# Patient Record
Sex: MALE | Race: BLACK OR AFRICAN AMERICAN | NOT HISPANIC OR LATINO | URBAN - METROPOLITAN AREA
[De-identification: names, ages, dates, MRNs, and addresses within clinical notes are randomized per-mention and may not be internally consistent; named-entity substitution may affect disease eponyms.]

---

## 2017-08-14 ENCOUNTER — EMERGENCY (EMERGENCY)
Facility: HOSPITAL | Age: 32
LOS: 1 days | Discharge: HOME | End: 2017-08-14
Attending: EMERGENCY MEDICINE | Admitting: EMERGENCY MEDICINE
Payer: COMMERCIAL

## 2017-08-14 VITALS
TEMPERATURE: 98 F | HEART RATE: 67 BPM | DIASTOLIC BLOOD PRESSURE: 78 MMHG | SYSTOLIC BLOOD PRESSURE: 146 MMHG | OXYGEN SATURATION: 99 % | RESPIRATION RATE: 18 BRPM

## 2017-08-14 DIAGNOSIS — S93.401A SPRAIN OF UNSPECIFIED LIGAMENT OF RIGHT ANKLE, INITIAL ENCOUNTER: ICD-10-CM

## 2017-08-14 DIAGNOSIS — M25.571 PAIN IN RIGHT ANKLE AND JOINTS OF RIGHT FOOT: ICD-10-CM

## 2017-08-14 PROCEDURE — 99283 EMERGENCY DEPT VISIT LOW MDM: CPT | Mod: 25

## 2017-08-14 PROCEDURE — 73610 X-RAY EXAM OF ANKLE: CPT | Mod: 26,RT

## 2017-08-15 VITALS
HEART RATE: 74 BPM | RESPIRATION RATE: 18 BRPM | SYSTOLIC BLOOD PRESSURE: 139 MMHG | DIASTOLIC BLOOD PRESSURE: 74 MMHG | TEMPERATURE: 98 F | OXYGEN SATURATION: 98 %

## 2017-08-15 PROCEDURE — 73610 X-RAY EXAM OF ANKLE: CPT | Mod: 26,RT

## 2017-08-15 RX ORDER — IBUPROFEN 200 MG
600 TABLET ORAL ONCE
Qty: 0 | Refills: 0 | Status: COMPLETED | OUTPATIENT
Start: 2017-08-15 | End: 2017-08-15

## 2017-08-15 RX ADMIN — Medication 600 MILLIGRAM(S): at 01:02

## 2017-08-15 NOTE — ED ADULT NURSE NOTE - OBJECTIVE STATEMENT
32y male, BIBEMS, with complaint of right ankle pain. Pt said he was walking, when he tripped, injuring his right ankle. No other trauma/injuries. No obvious deformity. denies head trauma. No wounds/lacerations noted. Pt complains of difficulty bearing weight on right foot.

## 2017-08-15 NOTE — ED PROVIDER NOTE - MEDICAL DECISION MAKING DETAILS
ankle sprain, no fracture on XR, will give nsaids, crutches as needed WBAT, ankle stirrup, follow up with ortho

## 2017-08-15 NOTE — ED PROVIDER NOTE - DIAGNOSTIC INTERPRETATION
xray R ankle: +soft tissue swelling. no acute fx or dislocation, joint space intact, no effusion noted

## 2017-08-15 NOTE — ED PROVIDER NOTE - OBJECTIVE STATEMENT
pt with R ankle pain after trip and inversion injury at 8pm, now increasing pain toR alteral ankle throbbing 6/10, worse with weight bearing, no radiation, no other injuries

## 2017-08-15 NOTE — ED PROVIDER NOTE - MUSCULOSKELETAL, MLM
Spine appears normal, range of motion is not limited, no muscle or joint tenderness, +R lateral mal tenderness with mild swelling, no foot ttp, SILT, 2+ pulses

## 2022-05-27 NOTE — ED ADULT NURSE NOTE - SEVERITY
Alert-The patient is alert, awake and responds to voice. The patient is oriented to time, place, and person. The triage nurse is able to obtain subjective information. PAIN SCALE 7 OF 10.

## 2022-09-01 ENCOUNTER — EMERGENCY (EMERGENCY)
Facility: HOSPITAL | Age: 37
LOS: 1 days | Discharge: HOME | End: 2022-09-01
Attending: STUDENT IN AN ORGANIZED HEALTH CARE EDUCATION/TRAINING PROGRAM | Admitting: STUDENT IN AN ORGANIZED HEALTH CARE EDUCATION/TRAINING PROGRAM

## 2022-09-01 VITALS
OXYGEN SATURATION: 99 % | DIASTOLIC BLOOD PRESSURE: 65 MMHG | HEART RATE: 75 BPM | SYSTOLIC BLOOD PRESSURE: 129 MMHG | TEMPERATURE: 97 F | RESPIRATION RATE: 16 BRPM

## 2022-09-01 PROCEDURE — 99284 EMERGENCY DEPT VISIT MOD MDM: CPT

## 2022-09-01 NOTE — ED ADULT TRIAGE NOTE - CHIEF COMPLAINT QUOTE
Pt c/o lower back pain beginning yesterday after exercising. was bending down when pain began. denies pmhx.

## 2022-09-01 NOTE — ED PROVIDER NOTE - OBJECTIVE STATEMENT
37 year old healthy males comes in for left sided lower back pain. He states he was deadlifting yesterday when he started to have some pain. After finishing a second set, he stopped and switched to the rest of his work out. He went home and this morning he noticed some left lower back pain/stiffness. He was able to go for a run this morning but he has found when he is standing up from a seated position, he has to move very slowly. He has not had any sensory changes in the legs and he has not had any bowel or bladder control changes. He denies any hx of back surgery and no saddle anesthesia.

## 2022-09-01 NOTE — ED PROVIDER NOTE - NSFOLLOWUPINSTRUCTIONS_ED_ALL_ED_FT
Take Tylenol and/or Ibuprofen for your back pain. Please rest and avoid doing exercises that make your back pain worse such as dead lifting.     If not improved in 2 weeks, follow with either:     [facility] (comprehensive, with Neurology, Orthopedic Surgery, Neurosurgery, and Physical Therapy): [phone].     Or Spine Surgeon Dr. Rodriges:    [phone]  [email or web address]  Email: [email or web address]    1. [place] Address: [address], [facility] [address] [facility], [address]. Hours: Mon-Fri 9am - 5pm and every 2nd Sat 9am - 3pm.    2. [place] Address: [address] [facility] [address] [place] [address] ([address] [facility]). Hours: Every Thursday 10am - 5pm.    3. [place] Address: [address] [facility] [facility] [address] (Inside [facility]). Hours: 10am - 5pm Every Tuesday (except 2nd) & Every 2nd Wednesday.

## 2022-09-01 NOTE — ED PROVIDER NOTE - ATTENDING CONTRIBUTION TO CARE
I have personally performed a face to face medical and diagnostic evaluation of the patient. I have discussed with and reviewed the Resident's note and agree with the History, ROS, Physical Exam and MDM unless otherwise indicated. A brief summary of my personal evaluation and impression can be found below.    37 year old healthy males comes in for left sided lower back pain. He states he was dead lifting yesterday when he started to have some pain. This am - left lower back pain/stiffness. He was able to go for a run this morning, but now having pain again. No direct injury to the back. No falls. Ambulating without difficulty. Denies paresthesias, weakness, urinary or bowel incontinence. Has not tried any meds for the pain yet.     On exam, VSS w +L straight leg raise. No midline tenderness in the C/T/L spine. No CVA tenderness. Patient neurovascularly intact. Possible sciatica/disc herniation. Will trial meds. Pt would benefit from spine center follow up. Return precautions given. Follow up information given. Shawna Glass, ED Attending

## 2022-09-01 NOTE — ED PROVIDER NOTE - PATIENT PORTAL LINK FT
You can access the FollowMyHealth Patient Portal offered by [facility] by registering at the following website: [email or web address]. By joining [employer]’s FollowMyHealth portal, you will also be able to view your health information using other applications (apps) compatible with our system.

## 2022-09-01 NOTE — ED PROVIDER NOTE - PHYSICAL EXAMINATION
GENERAL: Not in acute distress, non-toxic appearing  HEAD: normocephalic, atraumatic  HEENT: EOMI, normal conjunctiva, oral mucosa moist, neck supple  CARDIAC: regular rate and rhythm, normal S1 and S2,  no appreciable murmurs  PULM: clear to ascultation bilaterally, no crackles, rales, rhonchi, or wheezing  GI: abdomen nondistended, soft, nontender, no guarding or rebound tenderness  : No CVA tenderness  NEURO: alert and oriented x 3, normal speech, no focal motor or sensory deficits, gait normal, no gross neurologic deficit  MSK: + straight leg raise on the left side. Good strength in all 4 extremities. No visible deformities, no peripheral edema,   SKIN: No visible rashes, dry, well-perfused  PSYCH: appropriate mood and affect

## 2022-12-14 ENCOUNTER — EMERGENCY (EMERGENCY)
Facility: HOSPITAL | Age: 37
LOS: 1 days | Discharge: HOME | End: 2022-12-14
Admitting: EMERGENCY MEDICINE

## 2022-12-14 VITALS
RESPIRATION RATE: 18 BRPM | SYSTOLIC BLOOD PRESSURE: 165 MMHG | HEART RATE: 97 BPM | DIASTOLIC BLOOD PRESSURE: 99 MMHG | OXYGEN SATURATION: 100 % | TEMPERATURE: 101 F

## 2022-12-14 PROCEDURE — 99284 EMERGENCY DEPT VISIT MOD MDM: CPT

## 2022-12-14 NOTE — ED ADULT TRIAGE NOTE - CHIEF COMPLAINT QUOTE
Pt c/o chills x2 days. Denies cough/body aches. Pt states he took Tylenol around 6pm today with relief. Denies PMH.

## 2022-12-15 VITALS
DIASTOLIC BLOOD PRESSURE: 73 MMHG | HEART RATE: 93 BPM | OXYGEN SATURATION: 100 % | SYSTOLIC BLOOD PRESSURE: 142 MMHG | TEMPERATURE: 102 F | RESPIRATION RATE: 17 BRPM

## 2022-12-15 LAB
FLUAV AG NPH QL: SIGNIFICANT CHANGE UP
FLUBV AG NPH QL: SIGNIFICANT CHANGE UP
RSV RNA NPH QL NAA+NON-PROBE: SIGNIFICANT CHANGE UP
SARS-COV-2 RNA SPEC QL NAA+PROBE: DETECTED

## 2022-12-15 RX ORDER — ACETAMINOPHEN 500 MG
975 TABLET ORAL ONCE
Refills: 0 | Status: COMPLETED | OUTPATIENT
Start: 2022-12-15 | End: 2022-12-15

## 2022-12-15 RX ORDER — IBUPROFEN 200 MG
600 TABLET ORAL ONCE
Refills: 0 | Status: COMPLETED | OUTPATIENT
Start: 2022-12-15 | End: 2022-12-15

## 2022-12-15 RX ADMIN — Medication 975 MILLIGRAM(S): at 02:51

## 2022-12-15 RX ADMIN — Medication 600 MILLIGRAM(S): at 01:25

## 2022-12-15 NOTE — ED PROVIDER NOTE - CLINICAL SUMMARY MEDICAL DECISION MAKING FREE TEXT BOX
Called and left message for dad, with date and time of scheduled appointment and to call back if that does not work.   37yoM no PMH p/w chills/myalgias since last night  febrile on arrival  recent  baby, does not want to get sick    PLAN: VSS, nontoxic appearing  flu covid swab, motrin, dc

## 2022-12-15 NOTE — ED PROVIDER NOTE - PHYSICAL EXAMINATION
CONSTITUTIONAL: Well-appearing; well-nourished; in no apparent distress;  HEAD: Normocephalic, atraumatic;  EYES: conjunctiva and sclera WNL;  ENT: normal nose; no rhinorrhea; unremarkable pharynx  NECK/LYMPH: Supple  CARD: Normal S1, S2; no murmurs, rubs, or gallops noted  RESP: Normal chest excursion with respiration; breath sounds clear and equal bilaterally; no wheezes, rhonchi, or rales noted  EXT/MS: moves all extremities; distal pulses are normal, no pedal edema  SKIN: Normal for age and race; warm; dry; good turgor; no apparent lesions or exudate noted  NEURO: Awake, alert, oriented x 3, no gross deficits  PSYCH: Normal mood; appropriate affect

## 2022-12-15 NOTE — ED PROVIDER NOTE - NSFOLLOWUPINSTRUCTIONS_ED_ALL_ED_FT
may take tylenol up to 1000mg with food every 6-8 hours  may take ibuprofen up to 600mg with food every 6-8 hours for body aches.  it is likely you have the flu, covid or a viral illness. wear mask around others, wash your hands often, and drink plenty of fluids  return for shortness of breath, chest pain, dizziness, numbness, tingling, weakness, or any worsening symptoms.

## 2022-12-15 NOTE — ED PROVIDER NOTE - OBJECTIVE STATEMENT
37-year-old male no PMH presenting to ED complaining of chills/myalgias to his bilateral upper extremities.  Patient states this started last night.  Patient states that he was in the hospital with his partner who just had a baby last night when he felt very fatigued this morning and woke up with chills, took Tylenol around 6 PM which gave him some mild relief however the chills had returned prompting ER evaluation.  Patient concerned that he may be sick and does not want to get his child sick.  Patient denies any cough, sore throat, headaches, N/V/D/C/abdominal pain, dysuria.

## 2022-12-15 NOTE — ED PROVIDER NOTE - PATIENT PORTAL LINK FT
You can access the FollowMyHealth Patient Portal offered by [facility] by registering at the following website: [email or web address]. By joining [employer]’s FollowMyHealth portal, you will also be able to view your health information using other applications (apps) compatible with our system.

## 2023-04-09 NOTE — ED PROVIDER NOTE - NS ED ROS FT
None GENERAL: No fever, chills  HEENT: No cough, congestion, odynophagia, dysphagia  CARDIAC: No chest pain, palpitations, lightheadedness, syncope  PULM: No dyspnea, cough, wheezing   GI: No abdominal pain, nausea, vomiting, diarrhea, constipation, melena, hematochezia  : No urinary dysuria, frequency, incontinence, hematuria  NEURO: No headache, motor weakness, sensory changes  MSK: +back pain, No pain in extremities  SKIN: no rashes, hives, urticaria  HEME: no active bleeding, bruising

## 2025-02-14 ENCOUNTER — EMERGENCY (EMERGENCY)
Facility: HOSPITAL | Age: 40
LOS: 1 days | Discharge: HOME | End: 2025-02-14
Attending: EMERGENCY MEDICINE | Admitting: EMERGENCY MEDICINE
Payer: MEDICAID

## 2025-02-14 VITALS
HEIGHT: 72 IN | OXYGEN SATURATION: 100 % | SYSTOLIC BLOOD PRESSURE: 162 MMHG | DIASTOLIC BLOOD PRESSURE: 88 MMHG | WEIGHT: 179.9 LBS | RESPIRATION RATE: 16 BRPM | TEMPERATURE: 98 F | HEART RATE: 83 BPM

## 2025-02-14 PROBLEM — Z78.9 OTHER SPECIFIED HEALTH STATUS: Chronic | Status: ACTIVE | Noted: 2022-12-15

## 2025-02-14 PROCEDURE — 99284 EMERGENCY DEPT VISIT MOD MDM: CPT

## 2025-02-14 RX ORDER — LIDOCAINE HYDROCHLORIDE 30 MG/G
1 CREAM TOPICAL ONCE
Refills: 0 | Status: COMPLETED | OUTPATIENT
Start: 2025-02-14 | End: 2025-02-14

## 2025-02-14 RX ORDER — IBUPROFEN 600 MG/1
400 TABLET, FILM COATED ORAL ONCE
Refills: 0 | Status: COMPLETED | OUTPATIENT
Start: 2025-02-14 | End: 2025-02-14

## 2025-02-14 RX ORDER — IBUPROFEN 600 MG/1
1 TABLET, FILM COATED ORAL
Qty: 20 | Refills: 0
Start: 2025-02-14

## 2025-02-14 RX ORDER — ACETAMINOPHEN 160 MG/5ML
2 SUSPENSION ORAL
Qty: 30 | Refills: 0
Start: 2025-02-14

## 2025-02-14 RX ADMIN — Medication 5 MILLIGRAM(S): at 08:37

## 2025-02-14 RX ADMIN — IBUPROFEN 400 MILLIGRAM(S): 600 TABLET, FILM COATED ORAL at 08:37

## 2025-02-14 RX ADMIN — LIDOCAINE HYDROCHLORIDE 1 PATCH: 30 CREAM TOPICAL at 08:34

## 2025-02-14 NOTE — ED ADULT TRIAGE NOTE - CHIEF COMPLAINT QUOTE
Pt c/o left sided hip pain radiating to the knee. Pt unsure if he injured himself at the gym. Denies numbness, incontinence, and medical conditions, Able to ambulate and well appearing.

## 2025-02-14 NOTE — ED ADULT NURSE NOTE - OBJECTIVE STATEMENT
Pt c/o left sided hip pain radiating to the knee. Pt unsure if he injured himself at the gym. Denies numbness, incontinence, and medical conditions, Able to ambulate and well appearing. Lido patch placed, po meds given to take at home as per MD due to patient fasting. Call bell in reach.

## 2025-02-14 NOTE — ED PROVIDER NOTE - CONDITION AT DISCHARGE:
Improved A-T Advancement Flap Text: The defect edges were debeveled with a #15 scalpel blade.  Given the location of the defect, shape of the defect and the proximity to free margins an A-T advancement flap was deemed most appropriate.  Using a sterile surgical marker, an appropriate advancement flap was drawn incorporating the defect and placing the expected incisions within the relaxed skin tension lines where possible.    The area thus outlined was incised deep to adipose tissue with a #15 scalpel blade.  The skin margins were undermined to an appropriate distance in all directions utilizing iris scissors.

## 2025-02-14 NOTE — ED PROVIDER NOTE - CLINICAL SUMMARY MEDICAL DECISION MAKING FREE TEXT BOX
40 y/o male with no significant PMHx who presented to the ED for low back/left upper leg pain X 4 days.  Concern for Hip strain, Muscle spasm  Will give analgesia and recommend spine center f/u

## 2025-02-14 NOTE — ED PROVIDER NOTE - PATIENT PORTAL LINK FT
You can access the FollowMyHealth Patient Portal offered by [facility] by registering at the following website: [email or web address]. By joining [employer]’s FollowMyHealth portal, you will also be able to view your health information using other applications (apps) compatible with our system.

## 2025-02-14 NOTE — ED PROVIDER NOTE - NSFOLLOWUPINSTRUCTIONS_ED_ALL_ED_FT
YOU WERE SEEN FOR BACK/HIP PAIN    YOU HAVE BEEN DIAGNOSED WITH MUSCLE SPASM    REST AND PLENTY OF FLUIDS    MAY TAKE TYLENOL 650mg EVERY 4 HOURS AS NEEDED FOR MILD PAIN   MAY TAKE MOTRIN 400mg EVERY 6 HOURS AS NEEDED FOR MODERATE PAIN. TAKE WITH FOOD.   MAY TAKE FLEXERIL 5mg EVERY 6 HOURS AS NEEDED.     FOLLOW UP WITH SPINE CENTER AS DISCUSSED.     FOLLOW UP WITH YOUR PRIMARY CARE PROVIDER WITHIN 1 WEEK.    RETURN TO THE EMERGENCY DEPARTMENT FOR WORSENING PAIN, NUMBNESS, SWELLING, OR FOR ANY WORSENING SYMPTOMS.

## 2025-02-14 NOTE — ED PROVIDER NOTE - OBJECTIVE STATEMENT
40 y/o male with no significant PMHx who presented to the ED for low back/left upper leg pain X 4 days. Pt states he will get left low back/hip pain with radiation to anterior thigh when he is sitting for awhile will get this pain that improves with movement and stretching. Pt denies this occurred after working out and notes it improved with activity. Pt denies any swelling or pain to inguinal pain. 40 y/o male with no significant PMHx who presented to the ED for low back/left upper leg pain X 4 days. Pt states he will get left low back/hip pain with radiation to anterior thigh when he is sitting for awhile will get this pain that improves with movement and stretching. Pt denies this occurred after working out and notes it improved with activity. Pt denies any swelling or pain to inguinal pain. Pt denies any loss of bowel or bladder function. Pt denies any numbness or weakness. Pt denies any fever, chills, nausea, vomiting, SOB, chest pain, or abd pain.

## 2025-02-14 NOTE — ED PROVIDER NOTE - MDM ORDERS SUBMITTED SELECTION
Medications Itraconazole Counseling:  I discussed with the patient the risks of itraconazole including but not limited to liver damage, nausea/vomiting, neuropathy, and severe allergy.  The patient understands that this medication is best absorbed when taken with acidic beverages such as non-diet cola or ginger ale.  The patient understands that monitoring is required including baseline LFTs and repeat LFTs at intervals.  The patient understands that they are to contact us or the primary physician if concerning signs are noted.

## 2025-02-14 NOTE — ED PROVIDER NOTE - LOCATION
DR Velasco wanted pt to return back for the B/p , EKG to determine if the B/p is under the control and the rhythm if it is still [place] rhythm .[ as he wrote last visit 12/12/19  To return in 1 week ]  Please call pt to let her know and to  scheduled next available apt with Dr Velasco .   
Patient granddaughter is asking what.s the next step to her grandmother care.  
back

## 2025-02-14 NOTE — ED PROVIDER NOTE - MUSCULOSKELETAL, MLM
Spine appears normal, range of motion is not limited, no muscle or joint tenderness. FROM of b/l LE. No inguinal swelling or tenderness.

## 2025-06-09 NOTE — ED PROVIDER NOTE - CROS ED NEURO ALL NEG
LABS and ADDITIONAL STUDIES:                        12.5   15.10 )-----------( 321      ( 2025 03:29 )             36.9     129[L]  |  90[L]  |  19  ----------------------------<  685[HH]       3.8   |  19[L]  |  1.16    Ca    9.3      2025 03:29    TPro  8.2  /  Alb  3.6  /  TBili  0.6  /  DBili  x   /  AST  13  /  ALT  9   /  AlkPhos  89      04:40 - VBG - pH: 7.43  | pCO2: 45    | pO2: 31    | Lactate: 1.6      Hgb A1c: 13.0 (25 @ 03:29)    Urinalysis Basic - ( 2025 03:29 )  Color: Yellow / Appearance: Turbid / S.025 / pH: 6.5  Gluc: >=1000 mg/dL / Ketone: x  / Bili: Negative / Urobili: 1.0 mg/dL   Blood: Large / Protein: 100 mg/dL / Nitrite: Negative   Leuk Esterase: Moderate / RBC: 157 /HPF / WBC 4389 /HPF   Sq Epi: 1 /HPF / Bacteria: Occasional /HPF  Hyaline Casts: x/WBC Casts: x
negative...